# Patient Record
Sex: FEMALE | Race: WHITE
[De-identification: names, ages, dates, MRNs, and addresses within clinical notes are randomized per-mention and may not be internally consistent; named-entity substitution may affect disease eponyms.]

---

## 2019-01-08 ENCOUNTER — HOSPITAL ENCOUNTER (EMERGENCY)
Dept: HOSPITAL 14 - H.ER | Age: 34
Discharge: HOME | End: 2019-01-08
Payer: COMMERCIAL

## 2019-01-08 VITALS
HEART RATE: 86 BPM | DIASTOLIC BLOOD PRESSURE: 69 MMHG | RESPIRATION RATE: 20 BRPM | TEMPERATURE: 98.1 F | SYSTOLIC BLOOD PRESSURE: 114 MMHG

## 2019-01-08 VITALS — OXYGEN SATURATION: 100 %

## 2019-01-08 DIAGNOSIS — R42: Primary | ICD-10-CM

## 2019-01-08 DIAGNOSIS — E04.1: ICD-10-CM

## 2019-01-08 DIAGNOSIS — M62.838: ICD-10-CM

## 2019-01-08 LAB
ALBUMIN SERPL-MCNC: 4.5 G/DL (ref 3.5–5)
ALBUMIN/GLOB SERPL: 1.3 {RATIO} (ref 1–2.1)
ALT SERPL-CCNC: 18 U/L (ref 9–52)
AST SERPL-CCNC: 20 U/L (ref 14–36)
BASOPHILS # BLD AUTO: 0 K/UL (ref 0–0.2)
BASOPHILS NFR BLD: 0.5 % (ref 0–2)
BUN SERPL-MCNC: 14 MG/DL (ref 7–17)
CALCIUM SERPL-MCNC: 9.7 MG/DL (ref 8.4–10.2)
EOSINOPHIL # BLD AUTO: 0.1 K/UL (ref 0–0.7)
EOSINOPHIL NFR BLD: 1.1 % (ref 0–4)
ERYTHROCYTE [DISTWIDTH] IN BLOOD BY AUTOMATED COUNT: 15 % (ref 11.5–14.5)
GFR NON-AFRICAN AMERICAN: > 60
HGB BLD-MCNC: 13.5 G/DL (ref 12–16)
LYMPHOCYTES # BLD AUTO: 2.5 K/UL (ref 1–4.3)
LYMPHOCYTES NFR BLD AUTO: 33.7 % (ref 20–40)
MCH RBC QN AUTO: 26 PG (ref 27–31)
MCHC RBC AUTO-ENTMCNC: 32.4 G/DL (ref 33–37)
MCV RBC AUTO: 80.2 FL (ref 81–99)
MONOCYTES # BLD: 0.5 K/UL (ref 0–0.8)
MONOCYTES NFR BLD: 6.6 % (ref 0–10)
NEUTROPHILS # BLD: 4.2 K/UL (ref 1.8–7)
NEUTROPHILS NFR BLD AUTO: 58.1 % (ref 50–75)
NRBC BLD AUTO-RTO: 0 % (ref 0–0)
PLATELET # BLD: 286 K/UL (ref 130–400)
PMV BLD AUTO: 9.1 FL (ref 7.2–11.7)
RBC # BLD AUTO: 5.2 MIL/UL (ref 3.8–5.2)
WBC # BLD AUTO: 7.3 K/UL (ref 4.8–10.8)

## 2019-01-08 PROCEDURE — 80053 COMPREHEN METABOLIC PANEL: CPT

## 2019-01-08 PROCEDURE — 84484 ASSAY OF TROPONIN QUANT: CPT

## 2019-01-08 PROCEDURE — 96361 HYDRATE IV INFUSION ADD-ON: CPT

## 2019-01-08 PROCEDURE — 81025 URINE PREGNANCY TEST: CPT

## 2019-01-08 PROCEDURE — 85025 COMPLETE CBC W/AUTO DIFF WBC: CPT

## 2019-01-08 PROCEDURE — 96374 THER/PROPH/DIAG INJ IV PUSH: CPT

## 2019-01-08 PROCEDURE — 99285 EMERGENCY DEPT VISIT HI MDM: CPT

## 2019-01-08 PROCEDURE — 93005 ELECTROCARDIOGRAM TRACING: CPT

## 2019-01-08 PROCEDURE — 70450 CT HEAD/BRAIN W/O DYE: CPT

## 2019-01-08 PROCEDURE — 72125 CT NECK SPINE W/O DYE: CPT

## 2019-01-08 NOTE — ED PDOC
HPI:  Headache


Time Seen by Provider: 01/08/19 15:01


Chief Complaint (Nursing): Headache


Chief Complaint (Provider): Headache


History Per: Patient


History/Exam Limitations: no limitations


Onset/Duration Of Symptoms: Hrs


Current Symptoms Are (Timing): Still Present


Additional Complaint(s): 


32 y/o female with no significant PMHx presents to the ED for evaluation of a 

headache, onset one day ago. Patient reports pain is located in the back of the 

head radiating down the neck, back and right leg. Patient report pain is 

associated with eye pain, urinary frequency, generalized weakness and 

dizziness/lightheadedness. Patient describes pain in right distal foot as a burn

ing sensation. Patient reports of last taking Tylenol last night. Otherwise, 

patient denies abdominal pain, constipation, leg weakness, chest pain, shortness

of breath, rhinorrhea, numbness, incontinence, injury and sick contacts.  No 

constipation.  Headaches is not worst in her life.  








PMD: non [facility] Provider





Past Medical History


Reviewed: Historical Data, Nursing Documentation, Vital Signs


Vital Signs: 





                                Last Vital Signs











Temp  98.6 F   01/08/19 14:15


 


Pulse  90   01/08/19 14:15


 


Resp  18   01/08/19 14:15


 


BP  154/87 H  01/08/19 14:15


 


Pulse Ox  100   01/08/19 14:15














- Medical History


PMH: No Chronic Diseases





- Surgical History


Surgical History: No Surg Hx





- Family History


Family History: States: Unknown Family Hx





- Living Arrangements


Living Arrangements: With Family





- Home Medications


Home Medications: 


                                Ambulatory Orders











 Medication  Instructions  Recorded


 


Diazepam [Valium] 2 mg PO BID PRN #6 tab 01/08/19


 


Ibuprofen [Motrin] 600 mg PO TID 7 Days  tab 01/08/19














- Allergies


Allergies/Adverse Reactions: 


                                    Allergies











Allergy/AdvReac Type Severity Reaction Status Date / Time


 


No Known Allergies Allergy   Verified 01/08/19 14:17














Review of Systems


ROS Statement: Except As Marked, All Systems Reviewed And Found Negative


Constitutional: Positive for: Weakness


Eyes: Positive for: Pain


Cardiovascular: Negative for: Chest Pain


Respiratory: Negative for: Shortness of Breath


Gastrointestinal: Negative for: Abdominal Pain, Constipation


Genitourinary Female: Positive for: Frequency


Musculoskeletal: Positive for: Neck Pain, Back Pain, Leg Pain (R)


Neurological: Positive for: Headache, Dizziness (LIGHTHEADEDNESS).  Negative 

for: Weakness, Numbness





Physical Exam





- Reviewed


Nursing Documentation Reviewed: Yes


Vital Signs Reviewed: Yes





- Physical Exam


Appears: Positive for: No Acute Distress


Head Exam: Positive for: ATRAUMATIC, NORMAL INSPECTION, NORMOCEPHALIC


Skin: Positive for: Normal Color, Warm, Dry


Eye Exam: Positive for: Normal appearance, EOMI, PERRL


ENT: Positive for: Normal ENT Inspection.  Negative for: Nasal Congestion


Neck: Positive for: Trachea Midline.  Negative for: Normal (Muscle tightness to 

the back of the neck.)


Cardiovascular/Chest: Positive for: Regular Rate, Rhythm.  Negative for: Murmur


Respiratory: Positive for: Normal Breath Sounds.  Negative for: Respiratory 

Distress


Pulses-Dorsalis Pedis (L): 2+


Pulses-Dorsalis Pedis (R): 2+


Gastrointestinal/Abdominal: Positive for: Soft.  Negative for: Tenderness


Back: Positive for: Other (Mild tenderness to the right lower back. Muscle 

tightness to the right lower back.)


Extremity: Positive for: Normal ROM (of all extremities).  Negative for: 

Tenderness, Pedal Edema, Calf Tenderness, Deformity


Neurologic/Psych: Positive for: Alert, CNs II-XII, Oriented (x3).  Negative for:

 Motor/Sensory Deficits, Aphasia, Facial Droop





- Laboratory Results


Result Diagrams: 


                                 01/08/19 15:57





                                 01/08/19 15:57


Lab Results: 





no acute





- ECG


ECG: Positive for: Interpreted By Me, Viewed By Me


ECG Rhythm: Positive for: Normal QRS, Normal ST Segment, Sinus Rhythm


O2 Sat by Pulse Oximetry: 100 (RA)


Pulse Ox Interpretation: Normal





- CT Scan/US


  ** ct


Other Rad Studies (CT/US): Read By Radiologist


Other Rad Interpretation: no acute





- Progress


ED Course And Treament: 





1733:  Stable.  AAOx3.  Pain free.  Tolerated PO.  Fu with pcp.  Likely muscle 

spasms. 





Medical Decision Making


Medical Decision Making: 


Time: 1512


Plan:


-- CT Cervical Spine w/o Contrast


-- CT Head w/o Contrast


-- EKG


-- CMP


-- Troponin I


-- ED Urine Dipstick


-- ED Urine Pregnancy


-- CBC with Differentials


-- Sodium Chloride IV 1000 mls/hr


-- Toradol 15 mg IVP


-- Valium 5 mg PO





 

________________________________________________________________________________


___________


Scribe Attestation:


Documented by Eduard Mcgovern, acting as a scribe for Mikael Walker MD.





Provider Scribe Attestation:


All medical record entries made by the Scribe were at my direction and 

personally dictated by me. I have reviewed the chart and agree that the record 

accurately reflects my personal performance of the history, physical exam, 

medical decision making, and the department course for this patient. I have also

 personally directed, reviewed, and agree with the discharge instructions and 

disposition.





Disposition





- Clinical Impression


Clinical Impression: 


 Muscle spasm, Dizziness








- Patient ED Disposition


Is Patient to be Admitted: No


Counseled Patient/Family Regarding: Studies Performed, Diagnosis, Need For 

Followup, Rx Given





- Disposition


Referrals: 


[facility] [Outside] - 01/09/19


Disposition: Routine/Home


Disposition Time: 17:37


Condition: STABLE


Additional Instructions: 


Return if not better in 3 days. 


Prescriptions: 


Diazepam [Valium] 2 mg PO BID PRN #6 tab


 PRN Reason: Muscle Spasm


Ibuprofen [Motrin] 600 mg PO TID 7 Days  tab


Instructions:  Dizziness, Nonvertigo, (DC), Muscle Spasms (DC)

## 2019-01-09 NOTE — CARD
--------------- APPROVED REPORT --------------





Date of service: 01/08/2019



EKG Measurement

Heart [id number]

AR [id number]

[id number]

[id number]

[id number]



<Conclusion>

Normal sinus rhythm

Normal ECG